# Patient Record
Sex: FEMALE | Race: WHITE | NOT HISPANIC OR LATINO | Employment: UNEMPLOYED | URBAN - METROPOLITAN AREA
[De-identification: names, ages, dates, MRNs, and addresses within clinical notes are randomized per-mention and may not be internally consistent; named-entity substitution may affect disease eponyms.]

---

## 2018-10-26 PROBLEM — J42 BRONCHITIS, CHRONIC (HCC): Status: ACTIVE | Noted: 2018-10-26

## 2018-10-26 PROBLEM — Z91.09 HISTORY OF ENVIRONMENTAL ALLERGIES: Status: ACTIVE | Noted: 2018-10-26

## 2018-10-26 PROBLEM — Z90.710 POST HYSTERECTOMY MENOPAUSE: Status: ACTIVE | Noted: 2018-10-26

## 2018-10-26 PROBLEM — E89.40 POST HYSTERECTOMY MENOPAUSE: Status: ACTIVE | Noted: 2018-10-26

## 2018-10-26 PROBLEM — F41.0 PANIC DISORDER: Status: ACTIVE | Noted: 2018-10-26

## 2018-10-26 PROBLEM — J45.909 ASTHMA DUE TO ENVIRONMENTAL ALLERGIES (HCC): Status: ACTIVE | Noted: 2018-10-26

## 2018-10-26 PROBLEM — K31.9 DYSPEPSIA AND DISORDER OF FUNCTION OF STOMACH: Status: ACTIVE | Noted: 2018-07-17

## 2018-10-26 PROBLEM — Z87.442 PERSONAL HISTORY OF KIDNEY STONES: Status: ACTIVE | Noted: 2018-10-26

## 2018-10-26 PROBLEM — M35.9 CONNECTIVE TISSUE DISEASE (HCC): Status: ACTIVE | Noted: 2018-10-26

## 2018-10-26 PROBLEM — I10 HIGH BLOOD PRESSURE: Status: ACTIVE | Noted: 2018-10-26

## 2018-10-26 PROBLEM — D84.89 PRIMARY IMMUNE DEFICIENCY DISORDER (HCC): Status: ACTIVE | Noted: 2018-10-26

## 2018-10-26 PROBLEM — I73.00 RAYNAUD'S PHENOMENON: Status: ACTIVE | Noted: 2018-10-26

## 2018-10-26 PROBLEM — M85.80 OSTEOPENIA: Status: ACTIVE | Noted: 2018-10-26

## 2018-10-26 PROBLEM — F33.1 MDD (MAJOR DEPRESSIVE DISORDER), RECURRENT EPISODE, MODERATE (HCC): Status: ACTIVE | Noted: 2018-10-26

## 2018-10-26 PROBLEM — M24.80 HYPERMOBILE JOINT SYNDROME OF MULTIPLE SITES: Status: ACTIVE | Noted: 2018-10-26

## 2018-10-26 PROBLEM — R10.13 DYSPEPSIA AND DISORDER OF FUNCTION OF STOMACH: Status: ACTIVE | Noted: 2018-07-17

## 2018-10-26 PROBLEM — K31.7 FUNDIC GLAND POLYPOSIS OF STOMACH: Status: ACTIVE | Noted: 2018-07-05

## 2018-10-26 PROBLEM — R10.9 ABDOMINAL PAIN: Status: ACTIVE | Noted: 2018-07-17

## 2020-09-15 ENCOUNTER — APPOINTMENT (OUTPATIENT)
Dept: INTERPRETER SERVICES | Age: 45
End: 2020-09-15

## 2021-03-22 DIAGNOSIS — Z01.812 PRE-PROCEDURAL LABORATORY EXAMINATIONS: Primary | ICD-10-CM

## 2021-03-22 RX ORDER — HYDROCODONE BITARTRATE AND HOMATROPINE METHYLBROMIDE ORAL SOLUTION 5; 1.5 MG/5ML; MG/5ML
LIQUID ORAL 3 TIMES DAILY PRN
COMMUNITY

## 2021-03-22 RX ORDER — ALENDRONATE SODIUM 35 MG/1
35 TABLET ORAL
COMMUNITY

## 2021-03-22 RX ORDER — ONDANSETRON 4 MG/1
4 TABLET, FILM COATED ORAL EVERY 8 HOURS PRN
COMMUNITY

## 2021-03-22 RX ORDER — ALBUTEROL SULFATE 2.5 MG/3ML
2.5 SOLUTION RESPIRATORY (INHALATION) 3 TIMES DAILY PRN
COMMUNITY

## 2021-03-22 RX ORDER — PREDNISONE 10 MG/1
10 TABLET ORAL
COMMUNITY

## 2021-03-22 RX ORDER — PANTOPRAZOLE SODIUM 40 MG/1
40 TABLET, DELAYED RELEASE ORAL 2 TIMES DAILY
COMMUNITY

## 2021-03-22 RX ORDER — B-COMPLEX WITH VITAMIN C
100 TABLET ORAL EVERY MORNING
COMMUNITY

## 2021-03-22 RX ORDER — POTASSIUM CITRATE 15 MEQ/1
15 TABLET, EXTENDED RELEASE ORAL 2 TIMES DAILY
COMMUNITY

## 2021-03-22 RX ORDER — IMMUNE GLOBULIN (HUMAN) 10 G/100ML
INJECTION INTRAVENOUS; SUBCUTANEOUS ONCE
COMMUNITY

## 2021-03-22 RX ORDER — ATORVASTATIN CALCIUM 10 MG/1
10 TABLET, FILM COATED ORAL EVERY MORNING
COMMUNITY

## 2021-03-22 RX ORDER — QUETIAPINE FUMARATE 25 MG/1
25 TABLET, FILM COATED ORAL AT BEDTIME
COMMUNITY

## 2021-03-22 RX ORDER — BUSPIRONE HYDROCHLORIDE 10 MG/1
10 TABLET ORAL 2 TIMES DAILY
COMMUNITY

## 2021-03-22 RX ORDER — MONTELUKAST SODIUM 10 MG/1
10 TABLET ORAL NIGHTLY
COMMUNITY

## 2021-03-22 RX ORDER — CYPROHEPTADINE HYDROCHLORIDE 4 MG/1
4 TABLET ORAL DAILY PRN
COMMUNITY

## 2021-03-22 RX ORDER — CETIRIZINE HYDROCHLORIDE 10 MG/1
10 TABLET ORAL EVERY MORNING
COMMUNITY

## 2021-03-22 RX ORDER — HYDROCODONE BITARTRATE AND ACETAMINOPHEN 5; 325 MG/1; MG/1
1 TABLET ORAL EVERY 6 HOURS PRN
COMMUNITY

## 2021-03-22 RX ORDER — CHLORTHALIDONE 25 MG/1
12.5 TABLET ORAL EVERY MORNING
COMMUNITY

## 2021-03-22 RX ORDER — SPIRONOLACTONE 25 MG/1
25 TABLET ORAL DAILY PRN
COMMUNITY

## 2021-03-22 RX ORDER — FLUTICASONE FUROATE, UMECLIDINIUM BROMIDE AND VILANTEROL TRIFENATATE 200; 62.5; 25 UG/1; UG/1; UG/1
1 POWDER RESPIRATORY (INHALATION) EVERY MORNING
COMMUNITY

## 2021-03-22 RX ORDER — ESTRADIOL 0.05 MG/D
1 PATCH, EXTENDED RELEASE TRANSDERMAL
COMMUNITY

## 2021-03-22 RX ORDER — DICYCLOMINE HYDROCHLORIDE 10 MG/1
10 CAPSULE ORAL 3 TIMES DAILY
COMMUNITY

## 2021-03-22 RX ORDER — ALBUTEROL SULFATE 90 UG/1
2 AEROSOL, METERED RESPIRATORY (INHALATION) EVERY 4 HOURS PRN
COMMUNITY

## 2021-03-22 RX ORDER — MOMETASONE FUROATE AND FORMOTEROL FUMARATE DIHYDRATE 200; 5 UG/1; UG/1
1 AEROSOL RESPIRATORY (INHALATION) DAILY PRN
COMMUNITY

## 2021-03-22 RX ORDER — ALPRAZOLAM 0.5 MG/1
0.5 TABLET ORAL DAILY PRN
COMMUNITY

## 2021-03-22 ASSESSMENT — ACTIVITIES OF DAILY LIVING (ADL)
ADL_SCORE: 12
SENSORY_SUPPORT_DEVICES: EYEGLASSES
RECENT_DECLINE_ADL: NO
NEEDS_ASSIST: NO
ADL_SHORT_OF_BREATH: NO
ADL_BEFORE_ADMISSION: INDEPENDENT

## 2021-03-22 ASSESSMENT — COGNITIVE AND FUNCTIONAL STATUS - GENERAL
ARE YOU BLIND OR DO YOU HAVE SERIOUS DIFFICULTY SEEING, EVEN WHEN WEARING GLASSES: NO
ARE YOU DEAF OR DO YOU HAVE SERIOUS DIFFICULTY  HEARING: NO

## 2021-03-23 DIAGNOSIS — R00.0 HEART RATE FAST: ICD-10-CM

## 2021-03-23 DIAGNOSIS — Z01.812 PRE-PROCEDURAL LABORATORY EXAMINATIONS: Primary | ICD-10-CM

## 2021-03-24 ENCOUNTER — LAB SERVICES (OUTPATIENT)
Dept: LAB | Age: 46
End: 2021-03-24
Attending: UROLOGY

## 2021-03-24 ENCOUNTER — HOSPITAL ENCOUNTER (OUTPATIENT)
Dept: CARDIOLOGY | Age: 46
Discharge: HOME | End: 2021-03-24
Attending: UROLOGY

## 2021-03-24 DIAGNOSIS — Z01.812 PRE-PROCEDURAL LABORATORY EXAMINATIONS: ICD-10-CM

## 2021-03-24 DIAGNOSIS — R00.0 HEART RATE FAST: ICD-10-CM

## 2021-03-24 LAB
ATRIAL RATE (BPM): 87
P AXIS (DEGREES): 57
PR-INTERVAL (MSEC): 132
QRS-INTERVAL (MSEC): 84
QT-INTERVAL (MSEC): 344
QTC: 414
R AXIS (DEGREES): 28
REPORT TEXT: NORMAL
T AXIS (DEGREES): 100
VENTRICULAR RATE EKG/MIN (BPM): 87

## 2021-03-24 PROCEDURE — 93005 ELECTROCARDIOGRAM TRACING: CPT

## 2021-03-24 PROCEDURE — U0003 INFECTIOUS AGENT DETECTION BY NUCLEIC ACID (DNA OR RNA); SEVERE ACUTE RESPIRATORY SYNDROME CORONAVIRUS 2 (SARS-COV-2) (CORONAVIRUS DISEASE [COVID-19]), AMPLIFIED PROBE TECHNIQUE, MAKING USE OF HIGH THROUGHPUT TECHNOLOGIES AS DESCRIBED BY CMS-2020-01-R: HCPCS

## 2021-03-25 LAB
SARS-COV-2 RNA RESP QL NAA+PROBE: NOT DETECTED
SERVICE CMNT-IMP: NORMAL
SERVICE CMNT-IMP: NORMAL

## 2021-03-26 ENCOUNTER — APPOINTMENT (OUTPATIENT)
Dept: GENERAL RADIOLOGY | Age: 46
End: 2021-03-26
Attending: UROLOGY

## 2021-03-26 ENCOUNTER — ANESTHESIA EVENT (OUTPATIENT)
Dept: SURGERY | Age: 46
End: 2021-03-26

## 2021-03-26 ENCOUNTER — HOSPITAL ENCOUNTER (OUTPATIENT)
Age: 46
Discharge: HOME | End: 2021-03-26
Attending: UROLOGY | Admitting: UROLOGY

## 2021-03-26 ENCOUNTER — ANESTHESIA (OUTPATIENT)
Dept: SURGERY | Age: 46
End: 2021-03-26

## 2021-03-26 VITALS
BODY MASS INDEX: 32.27 KG/M2 | TEMPERATURE: 97.7 F | SYSTOLIC BLOOD PRESSURE: 130 MMHG | DIASTOLIC BLOOD PRESSURE: 85 MMHG | WEIGHT: 182.1 LBS | HEIGHT: 63 IN | OXYGEN SATURATION: 94 % | RESPIRATION RATE: 18 BRPM | HEART RATE: 99 BPM

## 2021-03-26 DIAGNOSIS — N20.1 URETERAL STONE: Primary | ICD-10-CM

## 2021-03-26 PROCEDURE — C1894 INTRO/SHEATH, NON-LASER: HCPCS | Performed by: UROLOGY

## 2021-03-26 PROCEDURE — 10002800 HB RX 250 W HCPCS: Performed by: NURSE ANESTHETIST, CERTIFIED REGISTERED

## 2021-03-26 PROCEDURE — C1769 GUIDE WIRE: HCPCS | Performed by: UROLOGY

## 2021-03-26 PROCEDURE — C2617 STENT, NON-COR, TEM W/O DEL: HCPCS | Performed by: UROLOGY

## 2021-03-26 PROCEDURE — 10006027 HB SUPPLY 278: Performed by: UROLOGY

## 2021-03-26 PROCEDURE — 13000003 HB ANESTHESIA  GENERAL EA ADD MINUTE: Performed by: UROLOGY

## 2021-03-26 PROCEDURE — 10004451 HB PACU RECOVERY 1ST 30 MINUTES: Performed by: UROLOGY

## 2021-03-26 PROCEDURE — 10002801 HB RX 250 W/O HCPCS: Performed by: NURSE ANESTHETIST, CERTIFIED REGISTERED

## 2021-03-26 PROCEDURE — 76000 FLUOROSCOPY <1 HR PHYS/QHP: CPT

## 2021-03-26 PROCEDURE — 10002803 HB RX 637: Performed by: UROLOGY

## 2021-03-26 PROCEDURE — 13000037 HB COMPLEX CASE EACH ADD MINUTE: Performed by: UROLOGY

## 2021-03-26 PROCEDURE — 13000001 HB PHASE II RECOVERY EA 30 MINUTES: Performed by: UROLOGY

## 2021-03-26 PROCEDURE — 82365 CALCULUS SPECTROSCOPY: CPT | Performed by: UROLOGY

## 2021-03-26 PROCEDURE — 13000002 HB ANESTHESIA  GENERAL  S/U + 1ST 15 MIN: Performed by: UROLOGY

## 2021-03-26 PROCEDURE — 10004452 HB PACU ADDL 30 MINUTES: Performed by: UROLOGY

## 2021-03-26 PROCEDURE — 10006023 HB SUPPLY 272: Performed by: UROLOGY

## 2021-03-26 PROCEDURE — 13000036 HB COMPLEX  CASE S/U + 1ST 15 MIN: Performed by: UROLOGY

## 2021-03-26 PROCEDURE — 10002805 HB CONTRAST AGENT: Performed by: UROLOGY

## 2021-03-26 PROCEDURE — 10002807 HB RX 258: Performed by: NURSE ANESTHETIST, CERTIFIED REGISTERED

## 2021-03-26 PROCEDURE — 10002800 HB RX 250 W HCPCS: Performed by: ANESTHESIOLOGY

## 2021-03-26 DEVICE — URETERAL STENT
Type: IMPLANTABLE DEVICE | Site: URETER | Status: FUNCTIONAL
Brand: CONTOUR™

## 2021-03-26 RX ORDER — ONDANSETRON 2 MG/ML
INJECTION INTRAMUSCULAR; INTRAVENOUS PRN
Status: DISCONTINUED | OUTPATIENT
Start: 2021-03-26 | End: 2021-03-26

## 2021-03-26 RX ORDER — EPHEDRINE SULFATE/0.9% NACL/PF 50 MG/10ML
5 SYRINGE (ML) INTRAVENOUS
Status: DISCONTINUED | OUTPATIENT
Start: 2021-03-26 | End: 2021-03-26 | Stop reason: HOSPADM

## 2021-03-26 RX ORDER — ACETAMINOPHEN AND CODEINE PHOSPHATE 300; 30 MG/1; MG/1
1-2 TABLET ORAL EVERY 4 HOURS PRN
Qty: 15 TABLET | Refills: 0 | Status: SHIPPED | OUTPATIENT
Start: 2021-03-26

## 2021-03-26 RX ORDER — PROPOFOL 10 MG/ML
INJECTION, EMULSION INTRAVENOUS PRN
Status: DISCONTINUED | OUTPATIENT
Start: 2021-03-26 | End: 2021-03-26

## 2021-03-26 RX ORDER — PROCHLORPERAZINE EDISYLATE 5 MG/ML
5 INJECTION INTRAMUSCULAR; INTRAVENOUS ONCE
Status: COMPLETED | OUTPATIENT
Start: 2021-03-26 | End: 2021-03-26

## 2021-03-26 RX ORDER — DEXAMETHASONE SODIUM PHOSPHATE 4 MG/ML
4 INJECTION, SOLUTION INTRA-ARTICULAR; INTRALESIONAL; INTRAMUSCULAR; INTRAVENOUS; SOFT TISSUE
Status: DISCONTINUED | OUTPATIENT
Start: 2021-03-26 | End: 2021-03-26 | Stop reason: HOSPADM

## 2021-03-26 RX ORDER — CIPROFLOXACIN 2 MG/ML
INJECTION, SOLUTION INTRAVENOUS PRN
Status: DISCONTINUED | OUTPATIENT
Start: 2021-03-26 | End: 2021-03-26

## 2021-03-26 RX ORDER — DEXTROSE MONOHYDRATE 50 MG/ML
INJECTION, SOLUTION INTRAVENOUS CONTINUOUS PRN
Status: DISCONTINUED | OUTPATIENT
Start: 2021-03-26 | End: 2021-03-26 | Stop reason: HOSPADM

## 2021-03-26 RX ORDER — ALBUTEROL SULFATE 2.5 MG/3ML
5 SOLUTION RESPIRATORY (INHALATION) ONCE
Status: DISCONTINUED | OUTPATIENT
Start: 2021-03-26 | End: 2021-03-26 | Stop reason: HOSPADM

## 2021-03-26 RX ORDER — ROCURONIUM BROMIDE 10 MG/ML
INJECTION, SOLUTION INTRAVENOUS PRN
Status: DISCONTINUED | OUTPATIENT
Start: 2021-03-26 | End: 2021-03-26

## 2021-03-26 RX ORDER — HYDRALAZINE HYDROCHLORIDE 20 MG/ML
5 INJECTION INTRAMUSCULAR; INTRAVENOUS EVERY 10 MIN PRN
Status: DISCONTINUED | OUTPATIENT
Start: 2021-03-26 | End: 2021-03-26 | Stop reason: HOSPADM

## 2021-03-26 RX ORDER — MIDAZOLAM HYDROCHLORIDE 1 MG/ML
2 INJECTION, SOLUTION INTRAMUSCULAR; INTRAVENOUS
Status: COMPLETED | OUTPATIENT
Start: 2021-03-26 | End: 2021-03-26

## 2021-03-26 RX ORDER — NALOXONE HCL 0.4 MG/ML
0.2 VIAL (ML) INJECTION EVERY 5 MIN PRN
Status: DISCONTINUED | OUTPATIENT
Start: 2021-03-26 | End: 2021-03-26 | Stop reason: HOSPADM

## 2021-03-26 RX ORDER — SODIUM CHLORIDE, SODIUM LACTATE, POTASSIUM CHLORIDE, CALCIUM CHLORIDE 600; 310; 30; 20 MG/100ML; MG/100ML; MG/100ML; MG/100ML
INJECTION, SOLUTION INTRAVENOUS CONTINUOUS PRN
Status: DISCONTINUED | OUTPATIENT
Start: 2021-03-26 | End: 2021-03-26

## 2021-03-26 RX ORDER — SODIUM CHLORIDE, SODIUM LACTATE, POTASSIUM CHLORIDE, CALCIUM CHLORIDE 600; 310; 30; 20 MG/100ML; MG/100ML; MG/100ML; MG/100ML
INJECTION, SOLUTION INTRAVENOUS CONTINUOUS
Status: DISCONTINUED | OUTPATIENT
Start: 2021-03-26 | End: 2021-03-26 | Stop reason: HOSPADM

## 2021-03-26 RX ORDER — SULFAMETHOXAZOLE AND TRIMETHOPRIM 800; 160 MG/1; MG/1
1 TABLET ORAL EVERY 12 HOURS SCHEDULED
Status: CANCELLED | OUTPATIENT
Start: 2021-03-26 | End: 2021-03-29

## 2021-03-26 RX ORDER — DEXTROSE MONOHYDRATE 25 G/50ML
25 INJECTION, SOLUTION INTRAVENOUS PRN
Status: DISCONTINUED | OUTPATIENT
Start: 2021-03-26 | End: 2021-03-26 | Stop reason: HOSPADM

## 2021-03-26 RX ORDER — DEXAMETHASONE SODIUM PHOSPHATE 4 MG/ML
INJECTION, SOLUTION INTRA-ARTICULAR; INTRALESIONAL; INTRAMUSCULAR; INTRAVENOUS; SOFT TISSUE PRN
Status: DISCONTINUED | OUTPATIENT
Start: 2021-03-26 | End: 2021-03-26

## 2021-03-26 RX ORDER — ONDANSETRON 2 MG/ML
4 INJECTION INTRAMUSCULAR; INTRAVENOUS 2 TIMES DAILY PRN
Status: DISCONTINUED | OUTPATIENT
Start: 2021-03-26 | End: 2021-03-26 | Stop reason: HOSPADM

## 2021-03-26 RX ORDER — LIDOCAINE HYDROCHLORIDE 10 MG/ML
INJECTION, SOLUTION INFILTRATION; PERINEURAL PRN
Status: DISCONTINUED | OUTPATIENT
Start: 2021-03-26 | End: 2021-03-26

## 2021-03-26 RX ORDER — METOCLOPRAMIDE HYDROCHLORIDE 5 MG/ML
5 INJECTION INTRAMUSCULAR; INTRAVENOUS EVERY 6 HOURS PRN
Status: DISCONTINUED | OUTPATIENT
Start: 2021-03-26 | End: 2021-03-26 | Stop reason: HOSPADM

## 2021-03-26 RX ORDER — SULFAMETHOXAZOLE AND TRIMETHOPRIM 800; 160 MG/1; MG/1
1 TABLET ORAL 2 TIMES DAILY
COMMUNITY

## 2021-03-26 RX ORDER — SODIUM CHLORIDE 9 MG/ML
INJECTION, SOLUTION INTRAVENOUS CONTINUOUS
Status: DISCONTINUED | OUTPATIENT
Start: 2021-03-26 | End: 2021-03-26 | Stop reason: HOSPADM

## 2021-03-26 RX ADMIN — SODIUM CHLORIDE, POTASSIUM CHLORIDE, SODIUM LACTATE AND CALCIUM CHLORIDE: 600; 310; 30; 20 INJECTION, SOLUTION INTRAVENOUS at 13:02

## 2021-03-26 RX ADMIN — LIDOCAINE HYDROCHLORIDE 77 ML: 10 INJECTION, SOLUTION INFILTRATION; PERINEURAL at 13:07

## 2021-03-26 RX ADMIN — ONDANSETRON 4 MG: 2 INJECTION INTRAMUSCULAR; INTRAVENOUS at 13:40

## 2021-03-26 RX ADMIN — Medication 100 MG: at 13:07

## 2021-03-26 RX ADMIN — PROCHLORPERAZINE EDISYLATE 5 MG: 5 INJECTION INTRAMUSCULAR; INTRAVENOUS at 14:15

## 2021-03-26 RX ADMIN — MEPERIDINE HYDROCHLORIDE 25 MG: 25 INJECTION INTRAMUSCULAR; INTRAVENOUS; SUBCUTANEOUS at 14:45

## 2021-03-26 RX ADMIN — FENTANYL CITRATE 100 MCG: 50 INJECTION, SOLUTION INTRAMUSCULAR; INTRAVENOUS at 13:07

## 2021-03-26 RX ADMIN — CIPROFLOXACIN 400 MG: 2 INJECTION, SOLUTION INTRAVENOUS at 13:02

## 2021-03-26 RX ADMIN — DEXAMETHASONE SODIUM PHOSPHATE 4 MG: 4 INJECTION, SOLUTION INTRAMUSCULAR; INTRAVENOUS at 13:02

## 2021-03-26 RX ADMIN — PROPOFOL 150 MG: 10 INJECTION, EMULSION INTRAVENOUS at 13:07

## 2021-03-26 RX ADMIN — ACETAMINOPHEN AND CODEINE PHOSPHATE 2 TABLET: 300; 30 TABLET ORAL at 15:59

## 2021-03-26 RX ADMIN — ROCURONIUM BROMIDE 5 MG: 50 INJECTION, SOLUTION INTRAVENOUS at 13:07

## 2021-03-26 RX ADMIN — MIDAZOLAM HYDROCHLORIDE 2 MG: 1 INJECTION, SOLUTION INTRAMUSCULAR; INTRAVENOUS at 12:58

## 2021-03-26 RX ADMIN — HYDROCORTISONE SODIUM SUCCINATE 100 MG: 100 INJECTION, POWDER, FOR SOLUTION INTRAMUSCULAR; INTRAVENOUS at 13:02

## 2021-03-26 RX ADMIN — HYDROCORTISONE SODIUM SUCCINATE 100 MG: 100 INJECTION, POWDER, FOR SOLUTION INTRAMUSCULAR; INTRAVENOUS at 13:24

## 2021-03-26 ASSESSMENT — PAIN SCALES - GENERAL
PAINLEVEL_OUTOF10: 1
PAINLEVEL_OUTOF10: 3
PAINLEVEL_OUTOF10: 5
PAINLEVEL_OUTOF10: 3
PAINLEVEL_OUTOF10: 3
PAINLEVEL_OUTOF10: 0

## 2021-03-31 LAB
APPEARANCE STONE: NORMAL
COMPN STONE: NORMAL
NUMBER STONE: NORMAL
SIZE STONE: NORMAL MM
SPECIMEN WT: 51 MG

## 2025-02-19 ENCOUNTER — OFFICE VISIT (OUTPATIENT)
Dept: OBGYN CLINIC | Facility: CLINIC | Age: 50
End: 2025-02-19
Payer: COMMERCIAL

## 2025-02-19 VITALS
BODY MASS INDEX: 32.61 KG/M2 | WEIGHT: 177.19 LBS | HEIGHT: 62 IN | DIASTOLIC BLOOD PRESSURE: 74 MMHG | SYSTOLIC BLOOD PRESSURE: 128 MMHG

## 2025-02-19 DIAGNOSIS — N95.1 SYMPTOMATIC MENOPAUSAL OR FEMALE CLIMACTERIC STATES: ICD-10-CM

## 2025-02-19 DIAGNOSIS — Z01.419 ENCOUNTER FOR ANNUAL ROUTINE GYNECOLOGICAL EXAMINATION: Primary | ICD-10-CM

## 2025-02-19 DIAGNOSIS — R61 NIGHT SWEATS: ICD-10-CM

## 2025-02-19 PROCEDURE — 99386 PREV VISIT NEW AGE 40-64: CPT | Performed by: OBSTETRICS & GYNECOLOGY

## 2025-02-19 PROCEDURE — 3008F BODY MASS INDEX DOCD: CPT | Performed by: OBSTETRICS & GYNECOLOGY

## 2025-02-19 PROCEDURE — 3074F SYST BP LT 130 MM HG: CPT | Performed by: OBSTETRICS & GYNECOLOGY

## 2025-02-19 PROCEDURE — 3078F DIAST BP <80 MM HG: CPT | Performed by: OBSTETRICS & GYNECOLOGY

## 2025-02-19 RX ORDER — ESTRADIOL 0.1 MG/D
1 FILM, EXTENDED RELEASE TRANSDERMAL
Qty: 24 EACH | Refills: 4 | Status: SHIPPED | OUTPATIENT
Start: 2025-02-20

## 2025-02-19 RX ORDER — ALENDRONATE SODIUM 35 MG/1
35 TABLET ORAL WEEKLY
COMMUNITY
Start: 2025-01-03

## 2025-02-19 RX ORDER — ATORVASTATIN CALCIUM 20 MG/1
TABLET, FILM COATED ORAL
COMMUNITY
Start: 2025-01-30

## 2025-02-19 RX ORDER — DILTIAZEM HYDROCHLORIDE 240 MG/1
240 CAPSULE, EXTENDED RELEASE ORAL DAILY
COMMUNITY

## 2025-02-19 RX ORDER — AMPICILLIN TRIHYDRATE 250 MG
CAPSULE ORAL
COMMUNITY

## 2025-02-19 RX ORDER — GABAPENTIN 300 MG/1
300 CAPSULE ORAL 3 TIMES DAILY PRN
Qty: 270 CAPSULE | Refills: 3 | Status: SHIPPED | OUTPATIENT
Start: 2025-02-19

## 2025-02-19 RX ORDER — NALTREXONE HCL 4.5 MG
CAPSULE ORAL
COMMUNITY

## 2025-02-19 RX ORDER — PHENTERMINE HYDROCHLORIDE 30 MG/1
CAPSULE ORAL
COMMUNITY

## 2025-02-19 RX ORDER — DICYCLOMINE HYDROCHLORIDE 10 MG/5ML
SOLUTION ORAL
COMMUNITY

## 2025-02-19 RX ORDER — DIPHENHYDRAMINE HCL 25 MG
CAPSULE ORAL
COMMUNITY

## 2025-02-19 RX ORDER — POTASSIUM CHLORIDE 14.9 MG/ML
INJECTION INTRAVENOUS
COMMUNITY

## 2025-02-19 RX ORDER — IMMUNE GLOBULIN (HUMAN) 10 G/100ML
INJECTION INTRAVENOUS; SUBCUTANEOUS
COMMUNITY

## 2025-02-19 RX ORDER — OMEPRAZOLE 40 MG/1
30 CAPSULE, DELAYED RELEASE ORAL
COMMUNITY

## 2025-02-19 NOTE — PROGRESS NOTES
ANNUAL GYN EXAM  [facility]    CHIEF COMPLAINT:    Chief Complaint   Patient presents with    Annual     Discuss HRT       HISTORY OF PRESENT ILLNESS:   Sarah Saldana is a 49 year old female   who presents for annual well woman visit.  She is feeling well without complaints.      Currently on 0.1 estradiol patch twice weekly. Occasionally still with night sweats twice weekly - states would like not to have them at all. Vaginal dryness has improved. Started HRT about 10 years ago.     Hysterectomy for bleeding . Then had ovaries removed on 2 separate occasions due to painful ovarian cysts. Last .    PAST GYNECOLOGICAL HISTORY & OTHER PREVENTIVE MEDICINE  LMP: No LMP recorded. Patient has had a hysterectomy.  Use of Birth Control (if yes, specify type): Hysterectomy (2025 11:28 AM)  Pap Date: 24 (2025 11:28 AM)  Pap Result Notes: neg (2025 11:28 AM)  Follow Up Recommendation: last mammo done 2024 WNL (2025 11:28 AM)    Complications:   Gravita/Parity:   Contraception: current -hysterectomy  Sexually transmitted disease history:PID, college  Number of sexual partners: current sexual partners: 1, 30 yrs, Lifetime partners:   Pap history: , vaginal smear   Date of last mammogram:  2024; history abnormals cyst aspiration  Last Colonoscopy: UTD; history abnormals denies  Abuse history: denies  Vaginal discharge: denies  Bladder symptoms: denies    PAST MEDICAL HISTORY:   Past Medical History:    Asthma (HCC)    Connective tissue disease (HCC)    Depression    Frequent episodes of bronchitis and pneumonia    High cholesterol    Hypertension    IBS (irritable bowel syndrome)    Low potassium syndrome    Medullary sponge kidney    Osteopenia    Seasonal allergies        PAST SURGICAL HISTORY:   Past Surgical History:   Procedure Laterality Date    Appendectomy      Colonoscopy      Hysterectomy      Other surgical history      r pcnl        PAST OB  HISTORY:  OB History    Para Term  AB Living   3 2 2   1     SAB IAB Ectopic Multiple Live Births   1              # Outcome Date GA Lbr Nathan/2nd Weight Sex Type Anes PTL Lv   3 SAB            2 Term            1 Term                CURRENT MEDICATIONS:      Current Outpatient Medications:     alendronate 35 MG Oral Tab, Take 1 tablet (35 mg total) by mouth once a week., Disp: , Rfl:     atorvastatin 20 MG Oral Tab, , Disp: , Rfl:     ASTRAGALUS ROOT OR, Take 1 tablet by mouth As Directed., Disp: , Rfl:     diphenhydrAMINE (BENADRYL ALLERGY) 25 MG Oral Cap, capsule, Disp: , Rfl:     Naltrexone HCl, Pain, (NALTREX) 4.5 MG Oral Cap, , Disp: , Rfl:     CHLORTHALIDONE OR, 12.5 mg., Disp: , Rfl:     Cinnamon 500 MG Oral Cap, 3 tablets three times a day, Disp: , Rfl:     dicyclomine 10 MG/5ML Oral Solution, , Disp: , Rfl:     dilTIAZem HCl ER Beads 240 MG Oral Capsule SR 24 Hr, Take 1 capsule (240 mg total) by mouth daily., Disp: , Rfl:     Escitalopram Oxalate (LEXAPRO OR), , Disp: , Rfl:     immune globulin, human, (GAMMAKED) 20 g/200mL Injection Solution, , Disp: , Rfl:     Mupirocin 2 % External Kit, , Disp: , Rfl:     Omeprazole 40 MG Oral Capsule Delayed Release, 30 capsules (1,200 mg total)., Disp: , Rfl:     potassium chloride 20 mEq/100mL Intravenous Solution, , Disp: , Rfl:     Phentermine HCl 30 MG Oral Cap, , Disp: , Rfl:     gabapentin 300 MG Oral Cap, Take 1 capsule (300 mg total) by mouth 3 (three) times daily as needed. Start 300 mg before sleep, can increase to 2-3 tablets as needed, Disp: 270 capsule, Rfl: 3    [START ON 2025] estradiol (VIVELLE-DOT) 0.1 MG/24HR Transdermal Patch Biweekly, Place 1 patch onto the skin twice a week., Disp: 24 each, Rfl: 4    Albuterol Sulfate HFA (PROAIR HFA) 108 (90 Base) MCG/ACT Inhalation Aero Soln, Inhale into the lungs., Disp: , Rfl:     ALPRAZolam (XANAX) 0.5 MG Oral Tab, Take by mouth., Disp: , Rfl:     Probiotic Product (PROBIOTIC-10 OR), Take by  mouth., Disp: , Rfl:     Cetirizine HCl (ZYRTEC ALLERGY) 10 MG Oral Cap, Take by mouth., Disp: , Rfl:     Riboflavin (VITAMIN B-2) 25 MG Oral Tab, Take by mouth., Disp: , Rfl:     Fluticasone Furoate 50 MCG/ACT Inhalation Aerosol Powder, Breath Activated, Inhale into the lungs., Disp: , Rfl:     Cyproheptadine HCl 4 MG Oral Tab, Take 1 tablet (4 mg total) by mouth 3 (three) times daily as needed., Disp: , Rfl:     Montelukast Sodium 10 MG Oral Tab, Take 1 tablet (10 mg total) by mouth nightly., Disp: , Rfl:     ondansetron 4 MG Oral Tablet Dispersible, Take 1 tablet (4 mg total) by mouth every 8 (eight) hours as needed for Nausea., Disp: , Rfl:     tamsulosin HCl 0.4 MG Oral Cap, Take 1 capsule (0.4 mg total) by mouth daily., Disp: 30 capsule, Rfl: 0    Potassium Citrate ER 15 MEQ (1620 MG) Oral Tab CR, Take 1 tablet by mouth 2 (two) times daily., Disp: 180 tablet, Rfl: 3    FLUoxetine HCl (PROZAC) 40 MG Oral Cap, Take 40 mg by mouth., Disp: , Rfl:     Mometasone Furo-Formoterol Fum (DULERA) 100-5 MCG/ACT Inhalation Aerosol, Inhale into the lungs., Disp: , Rfl:     Coenzyme Q10 (COQ-10) 10 MG Oral Cap, Take by mouth., Disp: , Rfl:     TraZODone HCl 100 MG Oral Tab, , Disp: , Rfl:     spironolactone 25 MG Oral Tab, , Disp: , Rfl:     Multiple Vitamins-Minerals (BIOTIN PLUS/CALCIUM/VIT D3) Oral Tab, Take by mouth., Disp: , Rfl:     lamoTRIgine 100 MG Oral Tab, Take 100 mg by mouth daily., Disp: , Rfl:     predniSONE 10 MG Oral Tab, Take 10 mg by mouth every 30 (thirty) days., Disp: , Rfl:     Pantoprazole Sodium 40 MG Oral Tab EC, Take 40 mg by mouth every morning before breakfast., Disp: , Rfl:     ALLERGIES:  Allergies[1]    SOCIAL HISTORY:  Social History     Socioeconomic History    Marital status:    Tobacco Use    Smoking status: Never    Smokeless tobacco: Never   Substance and Sexual Activity    Alcohol use: No    Drug use: No    Sexual activity: Yes     Birth control/protection: Hysterectomy   Other  Topics Concern    Blood Transfusions No       FAMILY HISTORY:  History reviewed. No pertinent family history.  ASSESSMENTS:  REVIEW OF SYSTEMS:  CONSTITUTIONAL:  negative for fevers, chills and sweats    EYES:  negative for  blurred vision and visual disturbance  RESPIRATORY:  negative for  cough and shortness of breath  CARDIOVASCULAR:  negative for  chest pain, palpitations  GASTROINTESTINAL:  No constipation/diarrhea, no pain  GENITOURINARY:  See History of Present Illness  INTEGUMENT/BREAST: Breast: no masses, no nipple discharge  ENDOCRINE:  negative for acne, constipation, diarrhea, cold intolerance, heat intolerance, fatigue, hair loss, weight gain and weight loss  MUSCULOSKELETAL:  negative for joint pain  NEUROLOGICAL:  negative for dizziness/lightheadedness and headaches  BEHAVIOR/PSYCH:  Negative for depressed mood, anhedonia and anxiety    PHYSICAL EXAM  No LMP recorded. Patient has had a hysterectomy.   Vitals:    02/19/25 1135   BP: 128/74   Weight: 177 lb 3.2 oz (80.4 kg)   Height: 62\"       CONSTITUTIONAL: Awake, alert, cooperative, no apparent distress, and appears stated age   NECK:  symmetrical, trachea midline, no adenopathy, thyroid symmetric, not enlarged   LUNGS: respiration unlabored  CARDIOVASCULAR: no peripheral edema or varicosities, skin warm and dry  ABDOMEN: Soft, non-distended, non-tender, no masses palpated    CHEST/BREASTS: Breasts symmetrical, skin without lesion(s), no nipple retraction or dimpling, no nipple discharge, no masses palpated, no axillary or supraclavicular adenopathy  GENITAL/URINARY:    External Genitalia:  General appearance; normal, Hair distribution; normal, Lesions absent   Urethral Meatus:  Lesions absent, Prolapse absent  Bladder:  Tenderness absent, Cystocele absent  Vagina:  Discharge absent, Lesions absent, Pelvic support normal  Cervix:  Absent  Uterus:  Absent  Adnexa:  Absent  Anus/Perineum:  Lesions absent    MUSCULOSKELETAL: There is no redness, warmth,  or swelling of the joints.  Full range of motion noted.  Motor strength is 5 out of 5 all extremities bilaterally.  Tone is normal.  NEUROLOGIC: Patient is awake, alert and oriented to name, place and time.  Casual gait is normal.  SKIN: no bruising or bleeding and no rashes  PSYCHIATRIC: Behavior:  Appropriate  Mood:  appropriate  ASSESSMENT AND PLAN:  1. Encounter for annual routine gynecological examination    - [facility] GUSTABO 2D+3D SCREENING BILAT (CPT=77067/52423); Future    2. Night sweats  Will try gabapetnin for now  - gabapentin 300 MG Oral Cap; Take 1 capsule (300 mg total) by mouth 3 (three) times daily as needed. Start 300 mg before sleep, can increase to 2-3 tablets as needed  Dispense: 270 capsule; Refill: 3    3. Symptomatic menopausal or female climacteric states    - estradiol (VIVELLE-DOT) 0.1 MG/24HR Transdermal Patch Biweekly; Place 1 patch onto the skin twice a week.  Dispense: 24 each; Refill: 4       Preventive Medicine in a 49 year old female  Health Maintenance Topics with due status: Overdue       Topic Date Due    Colorectal Cancer Screening Never done    Asthma Control Test Never done    Annual Physical Never done    Mammogram Never done    Pneumococcal Vaccine: 50+ Years Never done    DTaP,Tdap,and Td Vaccines Never done    Zoster Vaccines Never done    Pap Smear Never done    COVID-19 Vaccine 09/01/2024    Influenza Vaccine 10/01/2024    Annual Depression Screening Never done       COUNSELING/EDUCATION PERFORMED:   Breast Cancer Screening - monthly self breast exam  Colon Cancer screening    follow up 1 yr or as needed  Sheila Armenta MD       [1]   Allergies  Allergen Reactions    Cefadroxil HIVES and RASH    Erythromycin HIVES, ITCHING and NAUSEA AND VOMITING    Vancomycin FACE FLUSHING, HIVES and ITCHING    Hydrocodone-Acetaminophen NAUSEA AND VOMITING

## 2025-02-24 ENCOUNTER — TELEPHONE (OUTPATIENT)
Dept: OBGYN CLINIC | Facility: CLINIC | Age: 50
End: 2025-02-24

## 2025-02-24 NOTE — TELEPHONE ENCOUNTER
Incoming call from Alex at [facility] requesting to speak with a RN to clarify instructions for the following medication:     Medication Quantity Refills Start End   gabapentin 300 MG Oral Cap 270 capsule 3 2/19/2025 --   Sig:   Take 1 capsule (300 mg total) by mouth 3 (three) times daily as needed. Start 300 mg before sleep, can increase to 2-3 tablets as needed     Route:   Oral         Please assist.

## 2025-05-06 ENCOUNTER — HOSPITAL ENCOUNTER (OUTPATIENT)
Dept: MAMMOGRAPHY | Age: 50
Discharge: HOME | End: 2025-05-06
Attending: OBSTETRICS & GYNECOLOGY
Payer: COMMERCIAL

## 2025-05-06 DIAGNOSIS — Z01.419 ENCOUNTER FOR ANNUAL ROUTINE GYNECOLOGICAL EXAMINATION: ICD-10-CM

## 2025-05-06 PROCEDURE — 77067 SCR MAMMO BI INCL CAD: CPT | Performed by: OBSTETRICS & GYNECOLOGY

## 2025-05-06 PROCEDURE — 77063 BREAST TOMOSYNTHESIS BI: CPT | Performed by: OBSTETRICS & GYNECOLOGY

## 2025-05-12 ENCOUNTER — HOSPITAL ENCOUNTER (OUTPATIENT)
Dept: ULTRASOUND IMAGING | Facility: HOSPITAL | Age: 50
Discharge: HOME | End: 2025-05-12
Attending: OBSTETRICS & GYNECOLOGY
Payer: COMMERCIAL

## 2025-05-12 ENCOUNTER — HOSPITAL ENCOUNTER (OUTPATIENT)
Dept: MAMMOGRAPHY | Facility: HOSPITAL | Age: 50
Discharge: HOME | End: 2025-05-12
Attending: OBSTETRICS & GYNECOLOGY
Payer: COMMERCIAL

## 2025-05-12 DIAGNOSIS — R92.8 ABNORMAL MAMMOGRAM: ICD-10-CM

## 2025-05-12 PROCEDURE — 77065 DX MAMMO INCL CAD UNI: CPT | Performed by: OBSTETRICS & GYNECOLOGY

## 2025-05-12 PROCEDURE — 77061 BREAST TOMOSYNTHESIS UNI: CPT | Performed by: OBSTETRICS & GYNECOLOGY

## 2025-05-12 PROCEDURE — 76642 ULTRASOUND BREAST LIMITED: CPT | Performed by: OBSTETRICS & GYNECOLOGY

## 2025-05-12 NOTE — IMAGING NOTE
Discussed recommended breast biopsy with patient.  Patient is being recommended for stereotactic biopsy of the  left   Breast distortion  by Dr. Ledezma . Spouses names is Alexey. Pt has 1 son 26 1 dtr age 21.   Super orders placed. Pt may take xanax for procedure pt will have a . Pt was provided Friday 5/16 checking in at 915 am Dx [facility].    Hx Last Advil was Sat 5/10 will stay off Last diclofenac was 5/5 will stay off now. Takes 81 mg asa when travels ( would drive to [place] ) Last asa was 5/1/25 will stay off.   Pt history discussed as below:  Pt history of biopsy: No        Family history of cancer: dad had squamous cell pat aunt colorectal dx .Cousin age 53 cancer everywhere primary unknown  Pt history of breast cancer: no   Hx BCP use:     for 20 yr               HRT use:    currently  13 years  ivf clomid and then shots IUI  RECOMMENDATIONS:   STEREOTACTIC BREAST BIOPSY: LEFT BREAST     Recommedations :                      see [facility] for dictated radiology report   Reviewed pertinent patient history, family history of cancer, and patient medications  Discussed with patient Radiology’s protocol regarding medications, as well as over-the-counter vitamin or herbal supplements, as follows:  -All herbal supplements, Vitamin E, Fish Oil    -All NSAIDs (Ibuprofen, Motrin, Advil, Aleve, or other antiinflammatory medication)  and Aspirin  81mg currently being taken    not  recommended or prescribed by  your physician  should be held for 5  days prior to biopsy.  TOOK  ADVIL SAT 5/10/25 DICLOFENAC   -Aspirin 81 mg being taken related to a cardiac condition  or prescribed by your  physician should be held at the  direction of your physician.  Informed patient to call ordering physician for guidelines LAST DOSE 81 MG 5/1/25   -Blood thinners/antiplatelet medications (Coumadin, Plavix  ect) should be held at the  direction of your physician.  Informed patient to call ordering physician for guidelines DENIES  USAGE   Reviewed Stereotactic biopsy procedure, as below.  For this procedure,   stereotactic biopsy is being performed with tomosynthesis , you will sitting upright  with your breast in compression.  Mammography imaging will be used to locate the area in question that was seen on your previous breast imaging.  The Radiologist will then inject a local numbing medication into the area and then use a needle to collect cells from the site.  A marker, or clip, will then be placed in the biopsied area.  This marker is placed so this biopsy site is able to be accurately located upon future breast imaging.  After the clip is placed,  a dressing will be placed on the biopsy site.  Additional mammography films will then be taken to assure correct placement of the marker.    Educated the patient they will be awake during this procedure and are able to drive themselves home if they wish.    Educated patient that some soreness may occur after biopsy.  Discussed use of a supportive bra and ice packs after procedure, to decrease soreness.    Discussed with patient no swimming, bathing,  hot tubs , or submerging underwater for 5 days and   until the incision is closed and healed.  Educated patient on lifting restrictions - nothing heavier than a gallon of milk for  48 hours after the procedure.      Discussed with patient that some soreness and bruising is normal after biopsy but that prolonged or increased pain and bruising should be reported to the ordering physician.  An ace wrap will be applied over bra for added support and should be left on for 24 hours post procedure.  Reviewed results process with patient and shared that pathology results will be available within 2-3 business days of their biopsy.  Discussed results will be communicated by their ordering physician unless otherwise indicated.  Educated patient that once we receive an order from their physician our radiology secretaries will be calling them to schedule their  biopsy.

## 2025-05-13 NOTE — DISCHARGE INSTRUCTIONS
The Doctor (Radiologist) who performed your procedure was:     Place an ice pack over the biopsy site on top of your bra or on top of the ACE wrap (never apply ice directly over skin) for 10-15 minutes of every hour until bedtime for your comfort and to decrease bleeding.  Keep your sports bra or the ACE wrap (stereotactic and MRI biopsy) in place for 24 hours after your biopsy. This compression decreases bleeding and breast movement for your comfort. Wear a supportive bra for the next couple of days for comfort (sports bra for sleep).   Continue to wear, preferably, a sports bra or good supportive bra for 1 week and take off only to shower.  No baths or showers during the first 24 hours after biopsy. After this time you may take a shower. It's okay if the strips get wet but do not soak them. NO saunas, hot tubs or swimming until steri-strips fall off (approx. 5 days). This prevents infection and allows time for them to completely close and heal.  DO NOT remove the steri-strips. They will fall off in 5 days. If any type of irritation (redness, itching or blisters) develops in the area around the steri-strips, remove them gently. If the steri-strips do not fall off after 5 days, gently remove them. Keep the area clean and dry.  It is normal to have mild discomfort and bruising at the biopsy site.  You may take Tylenol as needed for discomfort, as long as you have no allergies to Tylenol. Do not take aspirin, motrin/ibuprofen or any medication containing NSAID (non-steroidal anti-inflammatory drug) product for 48 hours.  DO NOT participate in strenuous activity (aerobics, heavy lifting, housework, gardening, etc.) 48 hours after your biopsy to prevent bleeding.  You will receive results in 2-3 business days.  If you are having an MRI breast biopsy or an Ultrasound guided breast biopsy, you will be billed for the biopsy and unilateral mammogram separately.  If you have any questions about the procedure or your results,  please contact the Breast Care Coordinator Nurse at [phone].  Notify your ordering physician or primary physician for increased bleeding, pain or fever over 100. Or contact a Radiology Nurse at [phone] between 8am-4pm (after 4pm, your call will be directed to the [facility]).

## 2025-05-15 ENCOUNTER — TELEPHONE (OUTPATIENT)
Dept: ULTRASOUND IMAGING | Facility: HOSPITAL | Age: 50
End: 2025-05-15

## 2025-05-15 NOTE — TELEPHONE ENCOUNTER
Ed pt all Pt has Bx tomorrow was just prescribed ear drops . Pt left me a message if she should wait to start ear drops until after pt.Pt instructed can start now. Pre instructions reviewed with pt. Pt denies aspirin and NSAID usage will continue to stay off pt instructed to eat breakfast and is going to arrive tomorrow at [facility]  9 so she can sign the consent and take meds to relax for Bx. Pt will ahave a  and eat breakfast.

## 2025-05-16 ENCOUNTER — HOSPITAL ENCOUNTER (OUTPATIENT)
Dept: MAMMOGRAPHY | Facility: HOSPITAL | Age: 50
Discharge: HOME | End: 2025-05-16
Attending: OBSTETRICS & GYNECOLOGY
Payer: COMMERCIAL

## 2025-05-16 DIAGNOSIS — N64.89 DISTORTION OF CONTOUR OF BREAST: ICD-10-CM

## 2025-05-16 PROCEDURE — 19081 BX BREAST 1ST LESION STRTCTC: CPT | Performed by: OBSTETRICS & GYNECOLOGY

## 2025-05-16 PROCEDURE — 88305 TISSUE EXAM BY PATHOLOGIST: CPT | Performed by: OBSTETRICS & GYNECOLOGY

## 2025-05-19 ENCOUNTER — TELEPHONE (OUTPATIENT)
Dept: ULTRASOUND IMAGING | Facility: HOSPITAL | Age: 50
End: 2025-05-19

## 2025-05-19 NOTE — TELEPHONE ENCOUNTER
Sarah Saldana is s/p biopsy .  Phoned and introduced myself as breast coordinator .  Reinforced to patient  post biopsy care and instructions . Pt informed of discordant finding discordant explained I detail. Pt inform would need to have area excised . Pt was provided Dr Lomeli' name . Pt was unable to take number down . Pt informed me she would like to see Dr Ortiz a breast surgeon out of [place].Supportive care given. Pt verbalized having \"real good experience at [place] everyone was so kind and informative.  Pt to call me back if she needs assistance getting an appt with Dr Rowan at  at [place] .    Informed  and shared the pathology results as well as the recommendations from   for her breast imaging  as follows:      Pathology results shared (see epic for dictated pathology and radiology procedure report)  and recommendations are as follows:       RECOMMENDATION:   Pathology results are discordant.  Surgical excision is recommended for complete evaluation.  Left breast ultrasound recommended in six months for cyst described at 12:00 o'clock and 01:00 o'clock per diagnostic report dated May 12,   2025.               Sarah Saldanaacknowledges the above and denies questions. Sarah Saldana was also instructed to perform breast self exams and if any changes  develops any changes to contact ordering  physician immediately  for re evaluation..  Sarah Saldana verbalizes understanding and agreement.

## 2025-06-25 ENCOUNTER — PATIENT MESSAGE (OUTPATIENT)
Dept: OBGYN CLINIC | Facility: CLINIC | Age: 50
End: 2025-06-25

## 2025-06-25 DIAGNOSIS — N60.02 CYST OF LEFT BREAST: Primary | ICD-10-CM

## (undated) DEVICE — Device

## (undated) DEVICE — LAWSON - WATER STL IRR PIC 1000ML

## (undated) DEVICE — LAWSON - FEE HOLMIUM RENTAL

## (undated) NOTE — LETTER
[facility]  [address] [facility] [place] [address]  [phone]  Authorization for Imaging Procedure    I hereby authorize                            , my physician and his/her assistants (if applicable), which may include medical students, residents, and/or fellows, to perform the following procedure and administer such anesthesia as may be determined necessary by my physician: STEREOTACTIC GUIDED BIOPSY WITH TOMOSYNTHESIS OF THE LEFT BREAST WITH CLIP PLACEMENT on Sarah Saldana.   2.  I recognize that during the procedure, unforeseen conditions may necessitate additional or different procedures than those listed above. I, therefore, further authorize and request that the above-named physician, assistants, or designees perform such procedures as are, in their judgment, necessary and desirable.    3.  My physician has discussed prior to my procedure the potential benefits, risks and side effects of this procedure; the likelihood of achieving goals; and potential problems that might occur during recuperation. They also discussed reasonable alternatives to the procedure, including risks, benefits, and side effects related to the alternatives and risks related to not receiving this procedure. I have had all my questions answered and I acknowledge that no guarantee has been made as to the result that may be obtained.    4.  Should the need arise during my procedure, which includes change of level of care prior to discharge, I also consent to the administration of blood and/or blood products. Further, I understand that despite careful testing and screening of blood or blood products by collecting agencies, I may still be subject to ill effects as a result of receiving a blood transfusion and/or blood products. The following are some, but not all, of the potential risks that can occur: fever and allergic reactions, hemolytic reactions, transmission of diseases such as  Hepatitis, AIDS and Cytomegalovirus (CMV) and fluid overload. In the event that I wish to have an autologous transfusion of my own blood, or a directed donor transfusion, I will discuss this with my physician.  Check only if Refusing Blood or Blood Products  I understand refusal of blood or blood products as deemed necessary by my physician may have serious consequences to my condition to include possible death. I hereby assume responsibility for my refusal and release the hospital, its personnel, and my physicians from any responsibility for the consequences of my refusal.   [  ] Patient Refuses Blood      5.  I authorize the use of any specimen, organs, tissues, body parts or foreign objects that may be removed from my body during the procedure for diagnosis, research or teaching purposes and their subsequent disposal by hospital authorities. I also authorize the release of specimen test results and/or written reports to my treating physician on the hospital medical staff or other referring or consulting physicians involved in my care, at the discretion of the Pathologist or my treating physician.    6.  I consent to the photographing or videotaping of the procedures to be performed, including appropriate portions of my body for medical, scientific, or educational purposes, provided my identity is not revealed by the pictures or by descriptive texts accompanying them. If the procedure has been photographed/videotaped, the physician will obtain the original picture, image, videotape or CD. The hospital will not be responsible for storage, release or maintenance of the picture, image, tape or CD.   7.  I consent to the presence of a  or observers in the operating room as deemed necessary by my physician or their designees.    8.  I recognize that in the event my procedure results in extended X-Ray/fluoroscopy time, I may develop a skin reaction.    9.  If I have a Do Not Attempt Resuscitation  (DNAR) order in place, that status will be suspended while in the operating room, procedural suite, and during the recovery period unless otherwise explicitly stated by me (or a person authorized to consent on my behalf). The performing physician or my attending physician will determine when the applicable recovery period ends for purposes of reinstating the DNAR order.  10.  I acknowledge that my physician has explained sedation/analgesia administration to me including the risk and benefits I consent to the administration of sedation/analgesia as may be necessary or desirable in the judgment of my physician.      I CERTIFY THAT I HAVE READ AND FULLY UNDERSTAND THE ABOVE CONSENT FOR THE PROCEDURE.   Signature of Patient: _____________________________________________________________  Responsible person in case of minor, unconscious: ____________________________________  Relationship to patient:  __________________________________________________________  Signature of Witness: _______________________________Date: _________Time: __________    Statement of Physician: My signature below affirms that prior to the time of the procedure, I have explained to the patient and/or her guardian, the risks and benefits involved in the proposed treatment and any reasonable alternative to the proposed treatment. I have also explained the risks and benefits involved in the refusal of the proposed treatment and have answered the patient's questions. If I have a significant financial interest in a co-management agreement or a significant financial interest in any product or implant, or other significant relationship used in the procedure/surgery, I have disclosed this and had a discussion with my patient.  Signature of Physician:   _________________________________Date:_____________Time:________    Patient Name: Sarah Saldana : 10/11/1975  Printed: May 13, 2025   Medical Record #: [id number]